# Patient Record
Sex: FEMALE | Race: WHITE | NOT HISPANIC OR LATINO | Employment: FULL TIME | URBAN - METROPOLITAN AREA
[De-identification: names, ages, dates, MRNs, and addresses within clinical notes are randomized per-mention and may not be internally consistent; named-entity substitution may affect disease eponyms.]

---

## 2021-05-10 ENCOUNTER — PATIENT MESSAGE (OUTPATIENT)
Dept: RESEARCH | Facility: HOSPITAL | Age: 46
End: 2021-05-10

## 2022-11-19 PROBLEM — E11.65 TYPE 2 DIABETES MELLITUS WITH HYPERGLYCEMIA, WITHOUT LONG-TERM CURRENT USE OF INSULIN: Status: RESOLVED | Noted: 2022-11-19 | Resolved: 2022-11-19

## 2022-11-19 PROBLEM — R80.9 MICROALBUMINURIA: Status: ACTIVE | Noted: 2022-11-19

## 2022-11-19 PROBLEM — E11.65 TYPE 2 DIABETES MELLITUS WITH HYPERGLYCEMIA, WITHOUT LONG-TERM CURRENT USE OF INSULIN: Status: ACTIVE | Noted: 2022-11-19

## 2022-11-19 PROBLEM — E55.9 VITAMIN D DEFICIENCY, UNSPECIFIED: Status: ACTIVE | Noted: 2022-11-19

## 2023-07-05 ENCOUNTER — TELEPHONE (OUTPATIENT)
Dept: ENDOCRINOLOGY | Facility: CLINIC | Age: 48
End: 2023-07-05
Payer: COMMERCIAL

## 2023-07-05 NOTE — TELEPHONE ENCOUNTER
----- Message from Mamie Peña sent at 7/5/2023 10:49 AM CDT -----  Contact: Patient  Type:  Appointment Request    Caller is requesting a sooner appointment.  Caller declined first available appointment listed below.  Caller will not accept being placed on the waitlist and is requesting a message be sent to doctor.    Name of Caller:  Patient  When is the first available appointment?  N/A    Would the patient rather a call back or a response via [facility]?   Call back  Best Call Back Number:  [phone]    Additional Information: There is a referral in the system to schedule an appointment with Dr Bryan - patient states she tried scheduling on her [facility], but his name didn't pop up - tried scheduling through Epic and running into same - please call to schedule/advise - thank you

## 2023-08-09 ENCOUNTER — OFFICE VISIT (OUTPATIENT)
Dept: ENDOCRINOLOGY | Facility: CLINIC | Age: 48
End: 2023-08-09
Payer: COMMERCIAL

## 2023-08-09 VITALS
SYSTOLIC BLOOD PRESSURE: 132 MMHG | DIASTOLIC BLOOD PRESSURE: 86 MMHG | WEIGHT: 199.94 LBS | HEIGHT: 63 IN | OXYGEN SATURATION: 97 % | HEART RATE: 102 BPM | BODY MASS INDEX: 35.43 KG/M2

## 2023-08-09 DIAGNOSIS — E78.00 HYPERCHOLESTEROLEMIA: ICD-10-CM

## 2023-08-09 DIAGNOSIS — E03.1 CONGENITAL HYPOTHYROIDISM WITHOUT GOITER: ICD-10-CM

## 2023-08-09 DIAGNOSIS — E11.65 TYPE 2 DIABETES MELLITUS WITH HYPERGLYCEMIA, WITHOUT LONG-TERM CURRENT USE OF INSULIN: Primary | ICD-10-CM

## 2023-08-09 DIAGNOSIS — E11.65 CONTROLLED TYPE 2 DIABETES MELLITUS WITH HYPERGLYCEMIA, UNSPECIFIED WHETHER LONG TERM INSULIN USE: ICD-10-CM

## 2023-08-09 PROCEDURE — 3075F SYST BP GE 130 - 139MM HG: CPT | Mod: CPTII,S$GLB,, | Performed by: NURSE PRACTITIONER

## 2023-08-09 PROCEDURE — 3075F PR MOST RECENT SYSTOLIC BLOOD PRESS GE 130-139MM HG: ICD-10-PCS | Mod: CPTII,S$GLB,, | Performed by: NURSE PRACTITIONER

## 2023-08-09 PROCEDURE — 1159F MED LIST DOCD IN RCRD: CPT | Mod: CPTII,S$GLB,, | Performed by: NURSE PRACTITIONER

## 2023-08-09 PROCEDURE — 99999 PR PBB SHADOW E&M-EST. PATIENT-LVL V: ICD-10-PCS | Mod: PBBFAC,,, | Performed by: NURSE PRACTITIONER

## 2023-08-09 PROCEDURE — 99214 OFFICE O/P EST MOD 30 MIN: CPT | Mod: S$GLB,,, | Performed by: NURSE PRACTITIONER

## 2023-08-09 PROCEDURE — 3008F PR BODY MASS INDEX (BMI) DOCUMENTED: ICD-10-PCS | Mod: CPTII,S$GLB,, | Performed by: NURSE PRACTITIONER

## 2023-08-09 PROCEDURE — 3008F BODY MASS INDEX DOCD: CPT | Mod: CPTII,S$GLB,, | Performed by: NURSE PRACTITIONER

## 2023-08-09 PROCEDURE — 3079F PR MOST RECENT DIASTOLIC BLOOD PRESSURE 80-89 MM HG: ICD-10-PCS | Mod: CPTII,S$GLB,, | Performed by: NURSE PRACTITIONER

## 2023-08-09 PROCEDURE — 3079F DIAST BP 80-89 MM HG: CPT | Mod: CPTII,S$GLB,, | Performed by: NURSE PRACTITIONER

## 2023-08-09 PROCEDURE — 99214 PR OFFICE/OUTPT VISIT, EST, LEVL IV, 30-39 MIN: ICD-10-PCS | Mod: S$GLB,,, | Performed by: NURSE PRACTITIONER

## 2023-08-09 PROCEDURE — 99999 PR PBB SHADOW E&M-EST. PATIENT-LVL V: CPT | Mod: PBBFAC,,, | Performed by: NURSE PRACTITIONER

## 2023-08-09 PROCEDURE — 1159F PR MEDICATION LIST DOCUMENTED IN MEDICAL RECORD: ICD-10-PCS | Mod: CPTII,S$GLB,, | Performed by: NURSE PRACTITIONER

## 2023-08-09 RX ORDER — LANCETS
EACH MISCELLANEOUS
Qty: 100 EACH | Refills: 12 | Status: SHIPPED | OUTPATIENT
Start: 2023-08-09

## 2023-08-09 RX ORDER — INSULIN DEGLUDEC 200 U/ML
16 INJECTION, SOLUTION SUBCUTANEOUS NIGHTLY
Qty: 1 PEN | Refills: 0 | COMMUNITY
Start: 2023-08-09 | End: 2023-09-06 | Stop reason: SDUPTHER

## 2023-08-09 RX ORDER — INSULIN PUMP SYRINGE, 3 ML
EACH MISCELLANEOUS
Qty: 1 EACH | Refills: 0 | Status: SHIPPED | OUTPATIENT
Start: 2023-08-09 | End: 2024-08-08

## 2023-08-09 RX ORDER — OLOPATADINE HYDROCHLORIDE 1 MG/ML
1 SOLUTION/ DROPS OPHTHALMIC
COMMUNITY
Start: 2023-07-12

## 2023-08-09 NOTE — PATIENT INSTRUCTIONS
Start with 16 units of Tresiba tomorrow night.       YOu may increase by 2 units every 5 days until your fasting glucoses are less ,,150

## 2023-08-09 NOTE — PROGRESS NOTES
"Subjective     Patient ID: Jessica Pope is a 48 y.o. female.    Chief Complaint: Diabetes    HPI  Pt is a 48 y.o. wf  with a diagnosis of Type 2  diabetes mellitus diagnosed approximately 2007 as gestational, and then converted to Type 2 DM --2013. , as well as chronic conditions pending review including hypothryoidism, hlp. .  Other pertinent medical and social information noted includes, but not limited to: none . Works as . [employer]     Interim Events:  A1c recently per ob gyn--June 2023--11%. Likley pending EN.  Pt is asymptomatic regarding hyperglycemia. On max glimepiride and tolerated metformin.     Avoiding juices, drinking water    Bkft   usually just toast  or  rare cinnamon roll from s--but this is a rarity   Lunch--fast food to left overs    Supper--Tries to cook at [employer],  likes to smoke meat.      Current DM meds:   glimeperide 4 mg bid,  metformin 750 xr bid --(cannot tolerate higher dose).        Failed DM meds:  none        Back up plan for insulin pump hiatus:   Statin: atorvastatin 40mg        Not tolerated statin : na   ACE/ARB:none        Not tolerated ACE/ARB: none   Known Diabetic complications: none       Review of Systems   Constitutional:  Negative for activity change and fatigue.        Blood pressure 132/86, pulse 102, height 5' 3" (1.6 m), weight 90.7 kg (199 lb 15.3 oz), SpO2 97 %.   HENT:  Negative for hearing loss and trouble swallowing.    Eyes:  Negative for photophobia and visual disturbance.        Last Eye Exam:  overdue 2 years.     Respiratory:  Negative for cough and shortness of breath.    Cardiovascular:  Negative for chest pain and palpitations.   Gastrointestinal:  Positive for reflux. Negative for constipation and diarrhea.   Genitourinary:  Negative for frequency and urgency.   Musculoskeletal:  Negative for arthralgias and myalgias.   Integumentary:  Negative for rash and wound.   Neurological:  Negative for weakness and " "numbness.   Psychiatric/Behavioral:  Negative for sleep disturbance. The patient is not nervous/anxious.           Objective     Physical Exam  Constitutional:       Appearance: Normal appearance. She is obese.   HENT:      Head: Normocephalic and atraumatic.      Nose: Nose normal.      Mouth/Throat:      Mouth: Mucous membranes are moist.      Pharynx: Oropharynx is clear.   Eyes:      Extraocular Movements: Extraocular movements intact.      Conjunctiva/sclera: Conjunctivae normal.      Pupils: Pupils are equal, round, and reactive to light.   Neck:      Vascular: No carotid bruit.   Cardiovascular:      Rate and Rhythm: Normal rate and regular rhythm.   Pulmonary:      Effort: Pulmonary effort is normal.      Breath sounds: Normal breath sounds.   Musculoskeletal:         General: Normal range of motion.      Cervical back: Normal range of motion and neck supple.      Right lower leg: No edema.      Left lower leg: No edema.      Comments: Feet: no open wounds or calluses. Good pedal care  Pedal pulses +2 bilaterally, sensation via monofilament intact bilaterally    Lymphadenopathy:      Cervical: No cervical adenopathy.   Skin:     General: Skin is warm and dry.   Neurological:      General: No focal deficit present.      Mental Status: She is alert and oriented to person, place, and time.   Psychiatric:         Mood and Affect: Mood normal.         Behavior: Behavior normal.         Thought Content: Thought content normal.         Judgment: Judgment normal.            /86 (BP Location: Right arm, Patient Position: Sitting, BP Method: Large (Manual))   Pulse 102   Ht 5' 3" (1.6 m)   Wt 90.7 kg (199 lb 15.3 oz)   SpO2 97%   BMI 35.42 kg/m²     Hemoglobin A1C   Date Value Ref Range Status   11/02/2022 8.3 (H) <5.7 % of total Hgb Final     Comment:     For someone without known diabetes, a hemoglobin A1c  value of 6.5% or greater indicates that they may have   diabetes and this should be confirmed with " a follow-up   test.     For someone with known diabetes, a value <7% indicates   that their diabetes is well controlled and a value   greater than or equal to 7% indicates suboptimal   control. A1c targets should be individualized based on   duration of diabetes, age, comorbid conditions, and   other considerations.     Currently, no consensus exists regarding use of  hemoglobin A1c for diagnosis of diabetes for children.         08/03/2021 9.5 (H) <5.7 % of total Hgb Final     Comment:     For someone without known diabetes, a hemoglobin A1c  value of 6.5% or greater indicates that they may have   diabetes and this should be confirmed with a follow-up   test.     For someone with known diabetes, a value <7% indicates   that their diabetes is well controlled and a value   greater than or equal to 7% indicates suboptimal   control. A1c targets should be individualized based on   duration of diabetes, age, comorbid conditions, and   other considerations.     Currently, no consensus exists regarding use of  hemoglobin A1c for diagnosis of diabetes for children.         12/18/2020 9.4 (H) <5.7 % of total Hgb Final     Comment:     For someone without known diabetes, a hemoglobin A1c  value of 6.5% or greater indicates that they may have   diabetes and this should be confirmed with a follow-up   test.     For someone with known diabetes, a value <7% indicates   that their diabetes is well controlled and a value   greater than or equal to 7% indicates suboptimal   control. A1c targets should be individualized based on   duration of diabetes, age, comorbid conditions, and   other considerations.     Currently, no consensus exists regarding use of  hemoglobin A1c for diagnosis of diabetes for children.             Chemistry        Component Value Date/Time     11/02/2022 0827     06/06/2015 0725    K 4.4 11/02/2022 0827    K 4.2 06/06/2015 0725     11/02/2022 0827     06/06/2015 0725    CO2 24  "11/02/2022 0827    BUN 10 11/02/2022 0827    CREATININE 0.67 11/02/2022 0827    CREATININE 0.70 06/06/2015 0725     (H) 11/02/2022 0827        Component Value Date/Time    CALCIUM 9.1 11/02/2022 0827    ALKPHOS 105 10/10/2018 0743    AST 15 11/02/2022 0827    ALT 21 11/02/2022 0827    BILITOT 0.8 11/02/2022 0827    ESTGFRAFRICA 110 12/18/2020 0739    EGFRNONAA 95 12/18/2020 0739          Lab Results   Component Value Date    CHOL 180 11/02/2022     Lab Results   Component Value Date    HDL 75 11/02/2022     Lab Results   Component Value Date    LDLCALC 81 11/02/2022     Lab Results   Component Value Date    TRIG 137 11/02/2022     Lab Results   Component Value Date    CHOLHDL 2.4 11/02/2022     Lab Results   Component Value Date    MICALBCREAT 42 (H) 11/02/2022     Lab Results   Component Value Date    TSH 3.16 11/02/2022     No results found for: "[id number]"    Assessment and Plan     1. Type 2 Diabetes Mellitus  blood-glucose meter kit    lancets Misc    blood sugar diagnostic Strp    insulin degludec (TRESIBA FLEXTOUCH U-200) 200 unit/mL (3 mL) insulin pen      2. Controlled type 2 diabetes mellitus with hyperglycemia, unspecified whether long term insulin use  Ambulatory referral/consult to Endocrinology    Ambulatory referral/consult to Diabetes Education      3. Congenital hypothyroidism without goiter        4. Hypercholesterolemia          On atorvastatin  Should be on ace.   Start 16 Tresiba and can titrate up 2 units every 5 days.  1 sample.       ORDERS 08/09/2023   Cons diabetes ED--review logs--see if I need to add prandial  insulin, patsy review ss, and diet.     F/u 6 weeks.            "

## 2023-08-23 ENCOUNTER — CLINICAL SUPPORT (OUTPATIENT)
Dept: DIABETES | Facility: CLINIC | Age: 48
End: 2023-08-23
Payer: COMMERCIAL

## 2023-08-23 ENCOUNTER — TELEPHONE (OUTPATIENT)
Dept: ENDOCRINOLOGY | Facility: CLINIC | Age: 48
End: 2023-08-23
Payer: COMMERCIAL

## 2023-08-23 VITALS — WEIGHT: 201.25 LBS | BODY MASS INDEX: 33.53 KG/M2 | HEIGHT: 65 IN

## 2023-08-23 DIAGNOSIS — E11.65 CONTROLLED TYPE 2 DIABETES MELLITUS WITH HYPERGLYCEMIA, UNSPECIFIED WHETHER LONG TERM INSULIN USE: ICD-10-CM

## 2023-08-23 DIAGNOSIS — E11.65 TYPE 2 DIABETES MELLITUS WITH HYPERGLYCEMIA, WITHOUT LONG-TERM CURRENT USE OF INSULIN: Primary | ICD-10-CM

## 2023-08-23 PROCEDURE — G0108 DIAB MANAGE TRN  PER INDIV: HCPCS | Mod: S$GLB,,, | Performed by: DIETITIAN, REGISTERED

## 2023-08-23 PROCEDURE — 99999 PR PBB SHADOW E&M-EST. PATIENT-LVL III: CPT | Mod: PBBFAC,,, | Performed by: DIETITIAN, REGISTERED

## 2023-08-23 PROCEDURE — G0108 PR DIAB MANAGE TRN  PER INDIV: ICD-10-PCS | Mod: S$GLB,,, | Performed by: DIETITIAN, REGISTERED

## 2023-08-23 PROCEDURE — 99999 PR PBB SHADOW E&M-EST. PATIENT-LVL III: ICD-10-PCS | Mod: PBBFAC,,, | Performed by: DIETITIAN, REGISTERED

## 2023-08-23 RX ORDER — PEN NEEDLE, DIABETIC 30 GX3/16"
NEEDLE, DISPOSABLE MISCELLANEOUS
Qty: 100 EACH | Status: SHIPPED | OUTPATIENT
Start: 2023-08-23

## 2023-08-23 RX ORDER — INSULIN ASPART 100 [IU]/ML
INJECTION, SOLUTION INTRAVENOUS; SUBCUTANEOUS
Qty: 15 ML | Status: SHIPPED | OUTPATIENT
Start: 2023-08-23 | End: 2023-10-04 | Stop reason: CLARIF

## 2023-08-23 NOTE — PROGRESS NOTES
Diabetes Care Specialist Progress Note  Author: Karely King RD, CDE  Date: 8/23/2023    Program Intake  Reason for Diabetes Program Visit:: Initial Diabetes Assessment-Patient referred for log review and basic dm ed review.  She was last seen for dm ed in 2021.  She was started on insulin about 2 weeks ago and blood sugars are slowly improving.  Current diabetes risk level:: high    Lab Results   Component Value Date    LABA1C 7.3 (H) 02/19/2018    HGBA1C 8.3 (H) 11/02/2022     Clinical    Problem Review  Reviewed Problem List with Patient: yes  Active comorbidities affecting diabetes self-care.: no  Reviewed health maintenance: yes    Clinical Assessment  Current Diabetes Treatment: Oral Medication, Insulin-Metformin, Glimepiride, Tresiba 20 daily, Osiris added sliding scale insulin today  Have you ever experienced hypoglycemia?: yes (none recent)  Are you able to tell when your blood sugar is low?: Yes    Medication Information  How do you obtain your medications?: Patient drives  How many days a week do you miss your medications?: Never  Do you sometimes have difficulty refilling your medications?: No  Medication adherence impacting ability to self-manage diabetes?: No    Labs  Do you have regular lab work to monitor your medications?: Yes  Type of Regular Lab Work: A1c, Cholesterol, CBC (reports had recent A1c down with OBGYN and it was in 11% range)  Where do you get your labs drawn?: [facility]  Lab Compliance Barriers: No    Nutritional Status  Diet: Regular-Patient reports decrease in appetite.  She eats 2-3 meals daily.  Drinks mainly water and Sprite Zero.  Does not eat much fruit or drink milk often.  Eating moderate amount of starchy foods  Meal Plan 24 Hour Recall:   Meal Plan 24 Hour Recall - Breakfast:  Toast or grits or microwave breakfast sandwich  Change in appetite?: No  Dentation:: Intact  Recent Changes in Weight: No Recent Weight Change  Current nutritional status an area of need that is  impacting patient's ability to self-manage diabetes?: No    Additional Social History    Support  Does anyone support you with your diabetes care?: yes  Who supports you?: spouse  Who takes you to your medical appointments?: self  Does the current support meet the patient's needs?: Yes  Is Support an area impacting ability to self-manage diabetes?: No    Access to Mass Media & Technology  Does the patient have access to any of the following devices or technologies?: Smart phone  Media or technology needs impacting ability to self-manage diabetes?: No    Cognitive/Behavioral Health  Alert and Oriented: Yes  Difficulty Thinking: No  Requires Prompting: No  Requires assistance for routine expression?: No  Cognitive or behavioral barriers impacting ability to self-manage diabetes?: No    Culture/Adventism  Culture or Christianity beliefs that may impact ability to access healthcare: No    Communication  Language preference: English  Hearing Problems: No  Vision Problems: No  Communication needs impacting ability to self-manage diabetes?: No    Health Literacy  Preferred Learning Method: Face to Face  How often do you need to have someone help you read instructions, pamphlets, or written material from your doctor or pharmacy?: Never  Health literacy needs impacting ability to self-manage diabetes?: No    Diabetes Self-Management Skills Assessment    Diabetes Disease Process/Treatment Options  Patient/caregiver able to state what happens when someone has diabetes.: yes  Patient/caregiver knows what type of diabetes they have.: yes  Diabetes Type : Type II  Patient/caregiver able to identify at least three signs and symptoms of diabetes.: yes (denies most symptoms unless blood sugar is over 300)  Patient able to identify at least three risk factors for diabetes.: yes  Diabetes Disease Process/Treatment Options: Skills Assessment Completed: Yes  Assessment indicates:: Adequate understanding  Area of need?:  Yes    Nutrition/Healthy Eating  Challenges to healthy eating:: portion control, snacking between meals and at night (skipping meals)  Method of carbohydrate measurement:: eyeballing/guessing (has used My Fitness Pal in the past)  Patient can identify foods that impact blood sugar.: yes  Patient-identified foods:: starches (bread, pasta, rice, cereal), sweets, fruit/fruit juice, milk  Nutrition/Healthy Eating Skills Assessment Completed:: Yes  Assessment indicates:: Instruction Needed  Area of need?: Yes    Physical Activity/Exercise  Patient's daily activity level:: lightly active  Patient can identify forms of physical activity.: yes  Physical Activity/Exercise Skills Assessment Completed: : Yes  Assessment indicates:: Adequate understanding  Area of need?: Yes    Medications  Patient is able to describe current diabetes management routine.: yes  Diabetes management routine:: insulin, oral medications-has titrated Tresiba up to 20 units over past 2 weeks  Patient is able to identify current diabetes medications, dosages, and appropriate timing of medications.: yes  Patient understands the purpose of the medications taken for diabetes.: yes  Patient reports problems or concerns with current medication regimen.: no  Medication Skills Assessment Completed:: Yes  Assessment indicates:: Instruction Needed  Area of need?: Yes    Home Blood Glucose Monitoring  Patient states that blood sugar is checked at home daily.: yes  Monitoring Method:: home glucometer  How often do you check your blood sugar?: Twice a day  When do you check your blood sugar?:  alternating  When you check what is your typical blood sugar range? :  variable- see attached logs  Blood glucose logs:: yes  Blood glucose logs reviewed today?: yes -Osiris reviewed logs and will start her with sliding scale insulin in addition to Tresiba  Home Blood Glucose Monitoring Skills Assessment Completed: : Yes  Assessment indicates:: Adequate understanding  Area of  need?: No        Acute Complications  Patient is able to identify types of acute complications: Yes (reviewed symptoms, prevention and treatment of hypoglycemia)  Acute Complications Skills Assessment Completed: : Yes  Assessment indicates:: Adequate understanding  Area of need?: No    Chronic Complications  Patient can identify major chronic complications of diabetes.: yes  Stated chronic complications:: heart disease/heart attack, kidney disease  Patient is aware that having diabetes increases risk of heart disease?: Yes  Patient is taking statin?: Yes  Chronic Complications Skills Assessment Completed: : Yes  Assessment indicates:: Adequate understanding  Area of need?: No    Psychosocial/Coping  Patient can identify ways of coping with chronic disease.: yes  Patient-stated ways of coping with chronic disease:: support from loved ones  Psychosocial/Coping Skills Assessment Completed: : Yes  Assessment indicates:: Adequate understanding  Area of need?: No    Assessment Summary and Plan    Based on today's diabetes care assessment, the following areas of need were identified:          8/23/2023    12:01 AM   Social   Support No   Access to Mass Media/Tech No   Cognitive/Behavioral Health No   Culture/Baptism No   Communication No   Health Literacy No          8/23/2023    12:01 AM   Clinical   Medication Adherence No   Lab Compliance No   Nutritional Status No          8/23/2023    12:01 AM   Diabetes Self-Management Skills   Diabetes Disease Process/Treatment Options Yes- reviewed goal blood sugars and A1c goal   Nutrition/Healthy Eating Yes- reviewed carb sources and appropriate amounts per meal and snack/ importance of eating 3 meals daily reviewed.    Physical Activity/Exercise Yes- encouraged regular activity to help enhance improved blood sugar control   Medication Yes- care planning created today   Home Blood Glucose Monitoring No   Acute Complications No   Chronic Complications No   Psychosocial/Coping No       Today's interventions were provided through individual discussion, instruction, and written materials were provided.      Patient verbalized understanding of instruction and written materials.  Pt was able to return back demonstration of instructions today. Patient understood key points, needs reinforcement and further instruction.     Diabetes Self-Management Care Plan:    Today's Diabetes Self-Management Care Plan was developed with Jessica's input. Jessica has agreed to work toward the following goal(s) to improve his/her overall diabetes control.      Care Plan: Diabetes Management   Updates made since 7/24/2023 12:00 AM        Problem: Medications         Long-Range Goal: Patient Agrees to take Diabetes Medication and insulin as prescribed.  Will add Novolog sliding scale to her regimen and use as needed.    Start Date: 8/23/2023   Priority: Medium   Barriers: No Barriers Identified   Note:    Reviewed action of Novolog/Humalog insulin and timing of injections.  She can take this up to 4 times daily before each meal and before bedtime.  She will use following sliding scale as per Osiris:    150-200 - +2  201-250- +4  250-300- +6  301-350- +8  >350 - +10       Task: Reviewed with patient all current diabetes medications and provided basic review of the purpose, dosage, frequency, side effects, and storage of both oral and injectable diabetes medications. Completed 8/23/2023        Task: Reviewed use of sliding scale Completed 8/23/2023        Task: Discussed guidelines for preventing, detecting and treating hypoglycemia and hyperglycemia and reviewed the importance of meal and medication timing with diabetes mediations for prevention of hypoglycemia and maximum drug benefit. Completed 8/23/2023        Follow Up Plan     Follow up in about 3 months (around 11/23/2023).  Patient will f/u with Osiris on 9/21 and bring another glucose log to review at that point.   She can also send one in prior if she develops  any new patterns.  Encouraged her to call in interim with questions/concerns.      Today's care plan and follow up schedule was discussed with patient.  Jessica verbalized understanding of the care plan, goals, and agrees to follow up plan.        The patient was encouraged to communicate with his/her health care provider/physician and care team regarding his/her condition(s) and treatment.  I provided the patient with my contact information today and encouraged to contact me via phone or [facility] Portal as needed.     Length of Visit   Total Time: 45 Minutes

## 2023-08-23 NOTE — TELEPHONE ENCOUNTER
----- Message from Wesley Hernandez sent at 8/23/2023  3:28 PM CDT -----  Type:  Pharmacy Calling to Clarify an RX    Name of Caller:  Dayron  Pharmacy Name:  [facility]   Prescription Name:  insulin aspart U-100 (NOVOLOG FLEXPEN U-100 INSULIN) 100 unit/mL (3 mL) InPn pen  What do they need to clarify?:  Co-pay is $75, asking if Humalog can be substituted as the co-pay is only $35.   Best Call Back Number:  Phone: [phone] Fax: [phone]  Additional Information:  Please call back to advise Thanks!

## 2023-08-23 NOTE — TELEPHONE ENCOUNTER
S/w pharmacy. They said that novolog co-pay is $75 and humalog is $35. They want to know if is ok for you to change it to Humalog and if you can send a new Rx for Humalog    Please advise

## 2023-08-24 DIAGNOSIS — E11.65 TYPE 2 DIABETES MELLITUS WITH HYPERGLYCEMIA, WITHOUT LONG-TERM CURRENT USE OF INSULIN: Primary | ICD-10-CM

## 2023-08-24 RX ORDER — INSULIN LISPRO 100 [IU]/ML
INJECTION, SOLUTION INTRAVENOUS; SUBCUTANEOUS
Qty: 15 ML | Status: SHIPPED | OUTPATIENT
Start: 2023-08-24

## 2023-09-05 ENCOUNTER — PATIENT MESSAGE (OUTPATIENT)
Dept: ENDOCRINOLOGY | Facility: CLINIC | Age: 48
End: 2023-09-05
Payer: COMMERCIAL

## 2023-09-06 DIAGNOSIS — E11.65 TYPE 2 DIABETES MELLITUS WITH HYPERGLYCEMIA, WITHOUT LONG-TERM CURRENT USE OF INSULIN: ICD-10-CM

## 2023-09-06 RX ORDER — INSULIN DEGLUDEC 200 U/ML
20 INJECTION, SOLUTION SUBCUTANEOUS NIGHTLY
Qty: 5 PEN | Refills: 6 | Status: SHIPPED | OUTPATIENT
Start: 2023-09-06 | End: 2023-09-15 | Stop reason: CLARIF

## 2023-09-08 ENCOUNTER — TELEPHONE (OUTPATIENT)
Dept: ENDOCRINOLOGY | Facility: CLINIC | Age: 48
End: 2023-09-08
Payer: COMMERCIAL

## 2023-09-08 NOTE — TELEPHONE ENCOUNTER
Spoke to Dayron at pharm and adv we have not recvd any msgs but authorized change from tresiba to toujeo.

## 2023-09-08 NOTE — TELEPHONE ENCOUNTER
----- Message from Kareyl Luna sent at 9/8/2023 12:43 PM CDT -----  Contact[facility]  Type:  Pharmacy Calling to Clarify an RX  URGENT!    Name of Caller:abhijit  [facility]  Prescription Name:insulin degludec (TRESIBA FLEXTOUCH U-200) 200 unit/mL (3 mL) insulin pen  What do they need to clarify?:WANTS TO CHANGE IT TO Toujeo    Best Call Back Number:    Additional Information: Pt is now completely OUT of insulin    Wants to change the insulin degludec (TRESIBA FLEXTOUCH U-200) 200 unit/mL (3 mL) insulin pen  to Toujeo. The copay is just too expensive for the degludec.

## 2023-09-15 DIAGNOSIS — E11.65 TYPE 2 DIABETES MELLITUS WITH HYPERGLYCEMIA, WITHOUT LONG-TERM CURRENT USE OF INSULIN: Primary | ICD-10-CM

## 2023-09-15 RX ORDER — INSULIN GLARGINE 300 U/ML
INJECTION, SOLUTION SUBCUTANEOUS
Qty: 2 PEN | Refills: 12 | Status: SHIPPED | OUTPATIENT
Start: 2023-09-15 | End: 2023-10-18 | Stop reason: SDUPTHER

## 2023-10-04 ENCOUNTER — OFFICE VISIT (OUTPATIENT)
Dept: ENDOCRINOLOGY | Facility: CLINIC | Age: 48
End: 2023-10-04
Payer: COMMERCIAL

## 2023-10-04 VITALS
DIASTOLIC BLOOD PRESSURE: 82 MMHG | SYSTOLIC BLOOD PRESSURE: 140 MMHG | BODY MASS INDEX: 34.2 KG/M2 | WEIGHT: 205.25 LBS | OXYGEN SATURATION: 99 % | HEIGHT: 65 IN | HEART RATE: 101 BPM

## 2023-10-04 DIAGNOSIS — Z79.4 TYPE 2 DIABETES MELLITUS WITHOUT COMPLICATION, WITH LONG-TERM CURRENT USE OF INSULIN: Primary | ICD-10-CM

## 2023-10-04 DIAGNOSIS — E11.65 TYPE 2 DIABETES MELLITUS WITH HYPERGLYCEMIA, WITHOUT LONG-TERM CURRENT USE OF INSULIN: ICD-10-CM

## 2023-10-04 DIAGNOSIS — E11.9 TYPE 2 DIABETES MELLITUS WITHOUT COMPLICATION, WITH LONG-TERM CURRENT USE OF INSULIN: Primary | ICD-10-CM

## 2023-10-04 DIAGNOSIS — N93.8 DUB (DYSFUNCTIONAL UTERINE BLEEDING): ICD-10-CM

## 2023-10-04 PROCEDURE — 1159F PR MEDICATION LIST DOCUMENTED IN MEDICAL RECORD: ICD-10-PCS | Mod: CPTII,S$GLB,, | Performed by: NURSE PRACTITIONER

## 2023-10-04 PROCEDURE — 1159F MED LIST DOCD IN RCRD: CPT | Mod: CPTII,S$GLB,, | Performed by: NURSE PRACTITIONER

## 2023-10-04 PROCEDURE — 99999 PR PBB SHADOW E&M-EST. PATIENT-LVL IV: ICD-10-PCS | Mod: PBBFAC,,, | Performed by: NURSE PRACTITIONER

## 2023-10-04 PROCEDURE — 3008F BODY MASS INDEX DOCD: CPT | Mod: CPTII,S$GLB,, | Performed by: NURSE PRACTITIONER

## 2023-10-04 PROCEDURE — 3077F SYST BP >= 140 MM HG: CPT | Mod: CPTII,S$GLB,, | Performed by: NURSE PRACTITIONER

## 2023-10-04 PROCEDURE — 99213 OFFICE O/P EST LOW 20 MIN: CPT | Mod: S$GLB,,, | Performed by: NURSE PRACTITIONER

## 2023-10-04 PROCEDURE — 3008F PR BODY MASS INDEX (BMI) DOCUMENTED: ICD-10-PCS | Mod: CPTII,S$GLB,, | Performed by: NURSE PRACTITIONER

## 2023-10-04 PROCEDURE — 3079F DIAST BP 80-89 MM HG: CPT | Mod: CPTII,S$GLB,, | Performed by: NURSE PRACTITIONER

## 2023-10-04 PROCEDURE — 99999 PR PBB SHADOW E&M-EST. PATIENT-LVL IV: CPT | Mod: PBBFAC,,, | Performed by: NURSE PRACTITIONER

## 2023-10-04 PROCEDURE — 3079F PR MOST RECENT DIASTOLIC BLOOD PRESSURE 80-89 MM HG: ICD-10-PCS | Mod: CPTII,S$GLB,, | Performed by: NURSE PRACTITIONER

## 2023-10-04 PROCEDURE — 99213 PR OFFICE/OUTPT VISIT, EST, LEVL III, 20-29 MIN: ICD-10-PCS | Mod: S$GLB,,, | Performed by: NURSE PRACTITIONER

## 2023-10-04 PROCEDURE — 3077F PR MOST RECENT SYSTOLIC BLOOD PRESSURE >= 140 MM HG: ICD-10-PCS | Mod: CPTII,S$GLB,, | Performed by: NURSE PRACTITIONER

## 2023-10-04 RX ORDER — TIRZEPATIDE 2.5 MG/.5ML
2.5 INJECTION, SOLUTION SUBCUTANEOUS
Qty: 4 PEN | Refills: 0 | Status: SHIPPED | OUTPATIENT
Start: 2023-10-04 | End: 2023-10-29 | Stop reason: SDUPTHER

## 2023-10-04 NOTE — PROGRESS NOTES
Subjective     Patient ID: Jessica Pope is a 48 y.o. female.    Chief Complaint: Diabetes     HPI  Pt is a 48 y.o. wf  with a diagnosis of Type 2  diabetes mellitus diagnosed approximately 2007 as gestational, and then converted to Type 2 DM --2013. , as well as chronic conditions pending review including hypothryoidism, hlp. .  Other pertinent medical and social information noted includes, but not limited to: none . Works as . [employer], [employer]     Interim Events: Oct 4, 2023:  Pt initially seen and was  pending EN r/t DUB but reported a1c 11%.  Pt started on Tresiba--which she has titrated to 32 units and novolog ss.  Glucoses improved, but still abovoe goal . No hypoglycemia.   Drug costs are a concern as she has marked deductible.        Current DM meds:   glimeperide 4 mg bid,  metformin 750 xr bid --(cannot tolerate higher dose). Toujeo 32 units, humalog ss        Failed DM meds:  none        Back up plan for insulin pump hiatus:   Statin: atorvastatin 40mg        Not tolerated statin : na   ACE/ARB:none        Not tolerated ACE/ARB: none   Known Diabetic complications: none     Tech Passord:  Barbie:  [email or web address]  PW:  [email or web address]            Aug 9, 2023:  A1c recently per ob gyn--June 2023--11%. Neva pending EN.  Pt is asymptomatic regarding hyperglycemia. On max glimepiride and tolerated metformin.     Avoiding juices, drinking water    Bkft   usually just toast  or  rare cinnamon roll from [employer]--but this is a rarity   Lunch--fast food to left overs    Supper--Tries to cook at [employer],  likes to smoke meat.        Review of Systems   Constitutional:  Negative for activity change and fatigue.   HENT:  Negative for hearing loss and trouble swallowing.    Eyes:  Negative for photophobia and visual disturbance.        Last Eye Exam:  overdue 2 years.     Respiratory:  Negative for cough and shortness of breath.    Cardiovascular:  Negative for chest pain and palpitations.  "  Gastrointestinal:  Positive for reflux. Negative for constipation and diarrhea.   Genitourinary:  Negative for frequency and urgency.   Musculoskeletal:  Negative for arthralgias and myalgias.   Integumentary:  Negative for rash and wound.   Neurological:  Negative for weakness and numbness.   Psychiatric/Behavioral:  Negative for sleep disturbance. The patient is not nervous/anxious.           Objective     Physical Exam  Constitutional:       Appearance: Normal appearance. She is obese.   HENT:      Head: Normocephalic and atraumatic.      Nose: Nose normal.   Neurological:      General: No focal deficit present.      Mental Status: She is alert and oriented to person, place, and time.   Psychiatric:         Mood and Affect: Mood normal.         Behavior: Behavior normal.         Thought Content: Thought content normal.         Judgment: Judgment normal.            BP (!) 140/82 (BP Method: X-Large (Manual))   Pulse 101   Ht 5' 5" (1.651 m)   Wt 93.1 kg (205 lb 4 oz)   SpO2 99%   BMI 34.16 kg/m²     Hemoglobin A1C   Date Value Ref Range Status   11/02/2022 8.3 (H) <5.7 % of total Hgb Final     Comment:     For someone without known diabetes, a hemoglobin A1c  value of 6.5% or greater indicates that they may have   diabetes and this should be confirmed with a follow-up   test.     For someone with known diabetes, a value <7% indicates   that their diabetes is well controlled and a value   greater than or equal to 7% indicates suboptimal   control. A1c targets should be individualized based on   duration of diabetes, age, comorbid conditions, and   other considerations.     Currently, no consensus exists regarding use of  hemoglobin A1c for diagnosis of diabetes for children.         08/03/2021 9.5 (H) <5.7 % of total Hgb Final     Comment:     For someone without known diabetes, a hemoglobin A1c  value of 6.5% or greater indicates that they may have   diabetes and this should be confirmed with a follow-up "   test.     For someone with known diabetes, a value <7% indicates   that their diabetes is well controlled and a value   greater than or equal to 7% indicates suboptimal   control. A1c targets should be individualized based on   duration of diabetes, age, comorbid conditions, and   other considerations.     Currently, no consensus exists regarding use of  hemoglobin A1c for diagnosis of diabetes for children.         12/18/2020 9.4 (H) <5.7 % of total Hgb Final     Comment:     For someone without known diabetes, a hemoglobin A1c  value of 6.5% or greater indicates that they may have   diabetes and this should be confirmed with a follow-up   test.     For someone with known diabetes, a value <7% indicates   that their diabetes is well controlled and a value   greater than or equal to 7% indicates suboptimal   control. A1c targets should be individualized based on   duration of diabetes, age, comorbid conditions, and   other considerations.     Currently, no consensus exists regarding use of  hemoglobin A1c for diagnosis of diabetes for children.             Chemistry        Component Value Date/Time     11/02/2022 0827     06/06/2015 0725    K 4.4 11/02/2022 0827    K 4.2 06/06/2015 0725     11/02/2022 0827     06/06/2015 0725    CO2 24 11/02/2022 0827    BUN 10 11/02/2022 0827    CREATININE 0.67 11/02/2022 0827    CREATININE 0.70 06/06/2015 0725     (H) 11/02/2022 0827        Component Value Date/Time    CALCIUM 9.1 11/02/2022 0827    ALKPHOS 105 10/10/2018 0743    AST 15 11/02/2022 0827    ALT 21 11/02/2022 0827    BILITOT 0.8 11/02/2022 0827    ESTGFRAFRICA 110 12/18/2020 0739    EGFRNONAA 95 12/18/2020 0739          Lab Results   Component Value Date    CHOL 180 11/02/2022     Lab Results   Component Value Date    HDL 75 11/02/2022     Lab Results   Component Value Date    LDLCALC 81 11/02/2022     Lab Results   Component Value Date    TRIG 137 11/02/2022     Lab Results  "  Component Value Date    CHOLHDL 2.4 11/02/2022     Lab Results   Component Value Date    MICALBCREAT 42 (H) 11/02/2022     Lab Results   Component Value Date    TSH 3.16 11/02/2022     No results found for: "[id number]"    Assessment and Plan     1. Type 2 diabetes mellitus without complication, with long-term current use of insulin  tirzepatide (MOUNJARO) 2.5 mg/0.5 mL PnIj      2. Type 2 Diabetes Mellitus        3. DUB (dysfunctional uterine bleeding)            PLAN   On atorvastatin  Should be on ace.     Increase Toujeo from 32 to 36 with parameters for futher titration   Start mounjaro 2.5 mg--denies any personal or family hx of thyroid or pancreatic cancer or pancreatitis.  Cont glimepiride --for now.   Cont humalog ss.   Freestyle danny 3 sampled and started and sharing.        ORDERS 10/04/2023      2 week virutal--danny download.       "

## 2023-10-04 NOTE — PATIENT INSTRUCTIONS
Increase Toujeo from 32 to 36 units.       If after 5 days,   fasting glucoses are still > 150,  increase dose to 40 units.       Start mounjaro 2.5 mg.      Continue glimipiride    Cont metformin.

## 2023-10-11 ENCOUNTER — PATIENT MESSAGE (OUTPATIENT)
Dept: ENDOCRINOLOGY | Facility: CLINIC | Age: 48
End: 2023-10-11
Payer: COMMERCIAL

## 2023-10-18 ENCOUNTER — OFFICE VISIT (OUTPATIENT)
Dept: ENDOCRINOLOGY | Facility: CLINIC | Age: 48
End: 2023-10-18
Payer: COMMERCIAL

## 2023-10-18 DIAGNOSIS — E11.65 TYPE 2 DIABETES MELLITUS WITH HYPERGLYCEMIA, WITHOUT LONG-TERM CURRENT USE OF INSULIN: Primary | ICD-10-CM

## 2023-10-18 DIAGNOSIS — E11.65 TYPE 2 DIABETES MELLITUS WITH HYPERGLYCEMIA, WITHOUT LONG-TERM CURRENT USE OF INSULIN: ICD-10-CM

## 2023-10-18 PROCEDURE — 99213 PR OFFICE/OUTPT VISIT, EST, LEVL III, 20-29 MIN: ICD-10-PCS | Mod: 95,,, | Performed by: NURSE PRACTITIONER

## 2023-10-18 PROCEDURE — 99213 OFFICE O/P EST LOW 20 MIN: CPT | Mod: 95,,, | Performed by: NURSE PRACTITIONER

## 2023-10-18 RX ORDER — BLOOD-GLUCOSE SENSOR
1 EACH MISCELLANEOUS
Qty: 2 EACH | Refills: 12 | Status: SHIPPED | OUTPATIENT
Start: 2023-10-18 | End: 2024-01-26 | Stop reason: SDUPTHER

## 2023-10-18 RX ORDER — INSULIN GLARGINE 300 U/ML
30 INJECTION, SOLUTION SUBCUTANEOUS NIGHTLY
Qty: 2 PEN | Refills: 12 | Status: SHIPPED | OUTPATIENT
Start: 2023-10-18 | End: 2024-01-26 | Stop reason: SDUPTHER

## 2023-10-29 DIAGNOSIS — E11.9 TYPE 2 DIABETES MELLITUS WITHOUT COMPLICATION, WITH LONG-TERM CURRENT USE OF INSULIN: ICD-10-CM

## 2023-10-29 DIAGNOSIS — Z79.4 TYPE 2 DIABETES MELLITUS WITHOUT COMPLICATION, WITH LONG-TERM CURRENT USE OF INSULIN: ICD-10-CM

## 2023-10-30 RX ORDER — TIRZEPATIDE 2.5 MG/.5ML
2.5 INJECTION, SOLUTION SUBCUTANEOUS
Qty: 4 PEN | Refills: 0 | Status: SHIPPED | OUTPATIENT
Start: 2023-10-30 | End: 2024-01-02 | Stop reason: DRUGHIGH

## 2024-01-26 DIAGNOSIS — E11.65 TYPE 2 DIABETES MELLITUS WITH HYPERGLYCEMIA, WITHOUT LONG-TERM CURRENT USE OF INSULIN: ICD-10-CM

## 2024-01-31 RX ORDER — INSULIN GLARGINE 300 U/ML
30 INJECTION, SOLUTION SUBCUTANEOUS NIGHTLY
Qty: 3 PEN | Refills: 12 | Status: SHIPPED | OUTPATIENT
Start: 2024-01-31

## 2024-01-31 RX ORDER — BLOOD-GLUCOSE SENSOR
1 EACH MISCELLANEOUS
Qty: 2 EACH | Refills: 12 | Status: SHIPPED | OUTPATIENT
Start: 2024-01-31 | End: 2025-01-30

## 2024-04-10 ENCOUNTER — OFFICE VISIT (OUTPATIENT)
Dept: ENDOCRINOLOGY | Facility: CLINIC | Age: 49
End: 2024-04-10
Payer: COMMERCIAL

## 2024-04-10 VITALS
WEIGHT: 181.13 LBS | OXYGEN SATURATION: 100 % | DIASTOLIC BLOOD PRESSURE: 80 MMHG | HEIGHT: 65 IN | BODY MASS INDEX: 30.18 KG/M2 | HEART RATE: 92 BPM | SYSTOLIC BLOOD PRESSURE: 120 MMHG

## 2024-04-10 DIAGNOSIS — E11.9 TYPE 2 DIABETES MELLITUS WITHOUT COMPLICATION, WITH LONG-TERM CURRENT USE OF INSULIN: Primary | ICD-10-CM

## 2024-04-10 DIAGNOSIS — Z79.4 TYPE 2 DIABETES MELLITUS WITHOUT COMPLICATION, WITH LONG-TERM CURRENT USE OF INSULIN: Primary | ICD-10-CM

## 2024-04-10 DIAGNOSIS — E55.9 VITAMIN D DEFICIENCY, UNSPECIFIED: ICD-10-CM

## 2024-04-10 DIAGNOSIS — E03.1 CONGENITAL HYPOTHYROIDISM WITHOUT GOITER: ICD-10-CM

## 2024-04-10 DIAGNOSIS — E11.65 TYPE 2 DIABETES MELLITUS WITH HYPERGLYCEMIA, WITHOUT LONG-TERM CURRENT USE OF INSULIN: ICD-10-CM

## 2024-04-10 PROCEDURE — 99214 OFFICE O/P EST MOD 30 MIN: CPT | Mod: 25,S$GLB,, | Performed by: NURSE PRACTITIONER

## 2024-04-10 PROCEDURE — 3008F BODY MASS INDEX DOCD: CPT | Mod: CPTII,S$GLB,, | Performed by: NURSE PRACTITIONER

## 2024-04-10 PROCEDURE — 99999 PR PBB SHADOW E&M-EST. PATIENT-LVL III: CPT | Mod: PBBFAC,,, | Performed by: NURSE PRACTITIONER

## 2024-04-10 PROCEDURE — 95251 CONT GLUC MNTR ANALYSIS I&R: CPT | Mod: S$GLB,,, | Performed by: NURSE PRACTITIONER

## 2024-04-10 PROCEDURE — 3074F SYST BP LT 130 MM HG: CPT | Mod: CPTII,S$GLB,, | Performed by: NURSE PRACTITIONER

## 2024-04-10 PROCEDURE — 3079F DIAST BP 80-89 MM HG: CPT | Mod: CPTII,S$GLB,, | Performed by: NURSE PRACTITIONER

## 2024-04-10 PROCEDURE — 1159F MED LIST DOCD IN RCRD: CPT | Mod: CPTII,S$GLB,, | Performed by: NURSE PRACTITIONER

## 2024-04-10 NOTE — PROGRESS NOTES
Subjective     Patient ID: Jessica Pope is a 48 y.o. female.    Chief Complaint: Diabetes     HPI  Pt is a 48 y.o. wf  with a diagnosis of Type 2  diabetes mellitus diagnosed approximately 2007 as gestational, and then converted to Type 2 DM --2013. , as well as chronic conditions pending review including hypothryoidism, hlp. .  Other pertinent medical and social information noted includes, but not limited to: none . Works as . [employer], good historian     Interim Events:April 10, 2024:  No acute events or focal complaints.  Had recent labs but no A1C.  Sensor indicates average of 6.1% no c/o hypoglycemia     October 18, 2023:  Pt for 2 week sensor review after adding mounjaro, tresiba --with interim downware adjustment, and d/c of glimiperide in interim r/t c/o nocturnal hypoglycemia.  Pt doing well.  Hypoglycemia eliminated.      Current DM meds:   Toujeo 30 units, mounjaro 5 mg. Humalog ss,   metformin 750 xr bid --(cannot tolerate higher dose).        Failed DM meds:  glimeride 4 mg bid (d/c r/t hypoglycemia).        Back up plan for insulin pump hiatus:   Statin: atorvastatin 40mg        Not tolerated statin : na   ACE/ARB:none        Not tolerated ACE/ARB: none   Known Diabetic complications: none     Tech Passord:  Barbie:  [email or web address]  PW:  [email or web address]     Oct 4, 2023:  Pt initially seen and was  pending EN r/t DUB but reported a1c 11%.  Pt started on Tresiba--which she has titrated to 32 units and novolog ss.  Glucoses improved, but still abovoe goal . No hypoglycemia.   Drug costs are a concern as she has marked deductible.      CGMS Interpretation:       Sensor/Pump Interpretation or Prominent Theme: No recent hypoglycemia. Glucoses hovering low 100 range with some  expected prandial glucose elevations.        Aug 9, 2023:  A1c recently per ob gyn--June 2023--11%. Neva pending EN.  Pt is asymptomatic regarding hyperglycemia. On max glimepiride and tolerated metformin.     Avoiding  juices, drinking water    Bkft   usually just toast  or  rare cinnamon roll from [employer]--but this is a rarity   Lunch--fast food to left overs    Supper--Tries to cook at [employer],  likes to smoke meat.        Review of Systems   Constitutional:  Negative for activity change and fatigue.   HENT:  Negative for hearing loss and trouble swallowing.    Eyes:  Negative for photophobia and visual disturbance.        Last Eye Exam:  overdue 2 years.     Respiratory:  Negative for cough and shortness of breath.    Cardiovascular:  Negative for chest pain and palpitations.   Gastrointestinal:  Positive for reflux. Negative for constipation and diarrhea.   Genitourinary:  Negative for frequency and urgency.   Musculoskeletal:  Negative for arthralgias and myalgias.   Integumentary:  Negative for rash and wound.   Neurological:  Negative for weakness and numbness.   Psychiatric/Behavioral:  Negative for sleep disturbance. The patient is not nervous/anxious.           Objective     Physical Exam  Constitutional:       Appearance: Normal appearance. She is obese.   HENT:      Head: Normocephalic and atraumatic.      Nose: Nose normal.      Mouth/Throat:      Mouth: Mucous membranes are moist.      Pharynx: Oropharynx is clear.   Eyes:      Extraocular Movements: Extraocular movements intact.      Conjunctiva/sclera: Conjunctivae normal.      Pupils: Pupils are equal, round, and reactive to light.   Neck:      Vascular: No carotid bruit.   Cardiovascular:      Rate and Rhythm: Normal rate and regular rhythm.      Pulses: Normal pulses.      Heart sounds: Normal heart sounds.   Pulmonary:      Effort: Pulmonary effort is normal.      Breath sounds: Normal breath sounds.   Musculoskeletal:         General: Normal range of motion.      Cervical back: Normal range of motion and neck supple.      Right lower leg: No edema.      Left lower leg: No edema.   Lymphadenopathy:      Cervical: No cervical adenopathy.   Skin:     General:  "Skin is warm and dry.   Neurological:      General: No focal deficit present.      Mental Status: She is alert and oriented to person, place, and time.   Psychiatric:         Mood and Affect: Mood normal.         Behavior: Behavior normal.         Thought Content: Thought content normal.         Judgment: Judgment normal.            /80 (BP Location: Right arm, Patient Position: Sitting, BP Method: Large (Manual))   Pulse 92   Ht 5' 5" (1.651 m)   Wt 82.2 kg (181 lb 1.7 oz)   SpO2 100%   BMI 30.14 kg/m²     Hemoglobin A1C   Date Value Ref Range Status   06/13/2023 11.7 (A) 4.0 - 6.0 % Final   11/02/2022 8.3 (H) <5.7 % of total Hgb Final     Comment:     For someone without known diabetes, a hemoglobin A1c  value of 6.5% or greater indicates that they may have   diabetes and this should be confirmed with a follow-up   test.     For someone with known diabetes, a value <7% indicates   that their diabetes is well controlled and a value   greater than or equal to 7% indicates suboptimal   control. A1c targets should be individualized based on   duration of diabetes, age, comorbid conditions, and   other considerations.     Currently, no consensus exists regarding use of  hemoglobin A1c for diagnosis of diabetes for children.         08/03/2021 9.5 (H) <5.7 % of total Hgb Final     Comment:     For someone without known diabetes, a hemoglobin A1c  value of 6.5% or greater indicates that they may have   diabetes and this should be confirmed with a follow-up   test.     For someone with known diabetes, a value <7% indicates   that their diabetes is well controlled and a value   greater than or equal to 7% indicates suboptimal   control. A1c targets should be individualized based on   duration of diabetes, age, comorbid conditions, and   other considerations.     Currently, no consensus exists regarding use of  hemoglobin A1c for diagnosis of diabetes for children.             Chemistry        Component Value " "Date/Time     12/15/2023 0000     06/06/2015 0725    K 4.4 12/15/2023 0000    K 4.2 06/06/2015 0725     12/15/2023 0000     06/06/2015 0725    CO2 28 12/15/2023 0000    BUN 12 12/15/2023 0000    CREATININE 0.74 12/15/2023 0000    CREATININE 0.70 06/06/2015 0725     (H) 12/15/2023 0000        Component Value Date/Time    CALCIUM 9.1 12/15/2023 0000    ALKPHOS 105 10/10/2018 0743    AST 13 12/15/2023 0000    ALT 22 12/15/2023 0000    BILITOT 1.0 12/15/2023 0000    ESTGFRAFRICA 110 12/18/2020 0739    EGFRNONAA 95 12/18/2020 0739          Lab Results   Component Value Date    CHOL 154 12/15/2023     Lab Results   Component Value Date    HDL 63 12/15/2023     Lab Results   Component Value Date    LDLCALC 71 12/15/2023     Lab Results   Component Value Date    TRIG 117 12/15/2023     Lab Results   Component Value Date    CHOLHDL 2.4 12/15/2023     Lab Results   Component Value Date    MICALBCREAT 6 12/15/2023     Lab Results   Component Value Date    TSH 2.01 12/15/2023     No results found for: "[id number]"    Assessment and Plan     1. Type 2 diabetes mellitus without complication, with long-term current use of insulin  Comprehensive Metabolic Panel    Hemoglobin A1C    Lipid Panel    Microalbumin/Creatinine Ratio, Urine    TSH      2. Type 2 Diabetes Mellitus        3. Congenital hypothyroidism without goiter        4. Vitamin D Deficiency                PLAN   On atorvastatin  Should be on ace.   Cont    --30 Toujeo    Cont mounjaro 5 mg--denies any personal or family hx of thyroid or pancreatic cancer or pancreatitis.    Cont humalog ss.         ORDERS 04/10/2024     6 mo with fasting cmp, lipids, A1c, tsh, urine m/c    "

## 2024-05-09 ENCOUNTER — PATIENT MESSAGE (OUTPATIENT)
Dept: ENDOCRINOLOGY | Facility: CLINIC | Age: 49
End: 2024-05-09
Payer: COMMERCIAL

## 2024-05-14 DIAGNOSIS — E11.9 TYPE 2 DIABETES MELLITUS WITHOUT COMPLICATION, WITH LONG-TERM CURRENT USE OF INSULIN: Primary | ICD-10-CM

## 2024-05-14 DIAGNOSIS — Z79.4 TYPE 2 DIABETES MELLITUS WITHOUT COMPLICATION, WITH LONG-TERM CURRENT USE OF INSULIN: Primary | ICD-10-CM

## 2024-05-14 DIAGNOSIS — E11.65 TYPE 2 DIABETES MELLITUS WITH HYPERGLYCEMIA, WITHOUT LONG-TERM CURRENT USE OF INSULIN: ICD-10-CM

## 2024-05-14 RX ORDER — PEN NEEDLE, DIABETIC 30 GX3/16"
NEEDLE, DISPOSABLE MISCELLANEOUS
Qty: 100 EACH | Status: SHIPPED | OUTPATIENT
Start: 2024-05-14

## 2024-05-14 RX ORDER — TIRZEPATIDE 2.5 MG/.5ML
2.5 INJECTION, SOLUTION SUBCUTANEOUS
Qty: 4 PEN | Refills: 3 | Status: SHIPPED | OUTPATIENT
Start: 2024-05-14

## 2024-10-03 ENCOUNTER — LAB VISIT (OUTPATIENT)
Dept: LAB | Facility: HOSPITAL | Age: 49
End: 2024-10-03
Attending: NURSE PRACTITIONER
Payer: COMMERCIAL

## 2024-10-03 DIAGNOSIS — E11.65 TYPE 2 DIABETES MELLITUS WITH HYPERGLYCEMIA, WITHOUT LONG-TERM CURRENT USE OF INSULIN: ICD-10-CM

## 2024-10-03 DIAGNOSIS — Z79.4 TYPE 2 DIABETES MELLITUS WITHOUT COMPLICATION, WITH LONG-TERM CURRENT USE OF INSULIN: ICD-10-CM

## 2024-10-03 DIAGNOSIS — E11.9 TYPE 2 DIABETES MELLITUS WITHOUT COMPLICATION, WITH LONG-TERM CURRENT USE OF INSULIN: ICD-10-CM

## 2024-10-03 LAB
ALBUMIN SERPL BCP-MCNC: 4.1 G/DL (ref 3.5–5.2)
ALP SERPL-CCNC: 91 U/L (ref 55–135)
ALT SERPL W/O P-5'-P-CCNC: 17 U/L (ref 10–44)
ANION GAP SERPL CALC-SCNC: 11 MMOL/L (ref 8–16)
AST SERPL-CCNC: 15 U/L (ref 10–40)
BILIRUB SERPL-MCNC: 0.5 MG/DL (ref 0.1–1)
BUN SERPL-MCNC: 8 MG/DL (ref 6–20)
CALCIUM SERPL-MCNC: 9.7 MG/DL (ref 8.7–10.5)
CHLORIDE SERPL-SCNC: 103 MMOL/L (ref 95–110)
CHOLEST SERPL-MCNC: 249 MG/DL (ref 120–199)
CHOLEST/HDLC SERPL: 3.3 {RATIO} (ref 2–5)
CO2 SERPL-SCNC: 22 MMOL/L (ref 23–29)
CREAT SERPL-MCNC: 0.8 MG/DL (ref 0.5–1.4)
EST. GFR  (NO RACE VARIABLE): >60 ML/MIN/1.73 M^2
ESTIMATED AVG GLUCOSE: 128 MG/DL (ref 68–131)
GLUCOSE SERPL-MCNC: 107 MG/DL (ref 70–110)
HBA1C MFR BLD: 6.1 % (ref 4–5.6)
HDLC SERPL-MCNC: 75 MG/DL (ref 40–75)
HDLC SERPL: 30.1 % (ref 20–50)
LDLC SERPL CALC-MCNC: 150.8 MG/DL (ref 63–159)
NONHDLC SERPL-MCNC: 174 MG/DL
POTASSIUM SERPL-SCNC: 4.6 MMOL/L (ref 3.5–5.1)
PROT SERPL-MCNC: 7.3 G/DL (ref 6–8.4)
SODIUM SERPL-SCNC: 136 MMOL/L (ref 136–145)
TRIGL SERPL-MCNC: 116 MG/DL (ref 30–150)
TSH SERPL DL<=0.005 MIU/L-ACNC: 3.84 UIU/ML (ref 0.4–4)

## 2024-10-03 PROCEDURE — 80053 COMPREHEN METABOLIC PANEL: CPT | Performed by: NURSE PRACTITIONER

## 2024-10-03 PROCEDURE — 84443 ASSAY THYROID STIM HORMONE: CPT | Performed by: NURSE PRACTITIONER

## 2024-10-03 PROCEDURE — 80061 LIPID PANEL: CPT | Performed by: NURSE PRACTITIONER

## 2024-10-03 PROCEDURE — 83036 HEMOGLOBIN GLYCOSYLATED A1C: CPT | Performed by: NURSE PRACTITIONER

## 2024-11-21 ENCOUNTER — OFFICE VISIT (OUTPATIENT)
Dept: ENDOCRINOLOGY | Facility: CLINIC | Age: 49
End: 2024-11-21
Payer: COMMERCIAL

## 2024-11-21 VITALS
WEIGHT: 174.06 LBS | HEART RATE: 82 BPM | DIASTOLIC BLOOD PRESSURE: 83 MMHG | HEIGHT: 65 IN | BODY MASS INDEX: 29 KG/M2 | SYSTOLIC BLOOD PRESSURE: 125 MMHG

## 2024-11-21 DIAGNOSIS — E11.9 TYPE 2 DIABETES MELLITUS TREATED WITH INSULIN: ICD-10-CM

## 2024-11-21 DIAGNOSIS — E03.1 CONGENITAL HYPOTHYROIDISM WITHOUT GOITER: ICD-10-CM

## 2024-11-21 DIAGNOSIS — Z79.4 TYPE 2 DIABETES MELLITUS TREATED WITH INSULIN: ICD-10-CM

## 2024-11-21 DIAGNOSIS — E11.9 TYPE 2 DIABETES MELLITUS WITHOUT COMPLICATION, WITHOUT LONG-TERM CURRENT USE OF INSULIN: Primary | ICD-10-CM

## 2024-11-21 DIAGNOSIS — E88.810 METABOLIC SYNDROME X: ICD-10-CM

## 2024-11-21 PROCEDURE — 99999 PR PBB SHADOW E&M-EST. PATIENT-LVL III: CPT | Mod: PBBFAC,,, | Performed by: NURSE PRACTITIONER

## 2024-11-21 RX ORDER — METFORMIN HYDROCHLORIDE 750 MG/1
750 TABLET, EXTENDED RELEASE ORAL 2 TIMES DAILY
Qty: 180 TABLET | Refills: 3 | Status: SHIPPED | OUTPATIENT
Start: 2024-11-21

## 2024-11-21 RX ORDER — TIRZEPATIDE 5 MG/.5ML
5 INJECTION, SOLUTION SUBCUTANEOUS
Qty: 4 PEN | Refills: 12 | Status: SHIPPED | OUTPATIENT
Start: 2024-11-21 | End: 2025-02-19

## 2024-11-21 NOTE — PROGRESS NOTES
Subjective     Patient ID: Jessica Pope is a 49 y.o. female.    Chief Complaint: Diabetes     HPI  Pt is a 49 y.o. wf  with a diagnosis of Type 2  diabetes mellitus diagnosed approximately 2007 as gestational, and then converted to Type 2 DM --2013. , as well as chronic conditions pending review including hypothryoidism, hlp. .  Other pertinent medical and social information noted includes, but not limited to: none . Works as . [employer], good historian     Interim Events:Nov 21, 2024:  Off toujeo x 3 months. Doing great. Wt down 7 lbs, but about 25 from 18 mo ago.  Sensor downloaded. Running near euglycemic ATC. No focal complaints.     Current DM meds:   Toujeo 30 units, mounjaro 5 mg. Humalog ss,   metformin 750 xr bid --(cannot tolerate higher dose).        Failed DM meds:  glimeride 4 mg bid (d/c r/t hypoglycemia).        Back up plan for insulin pump hiatus:   Statin: atorvastatin 40mg        Not tolerated statin : na   ACE/ARB:none        Not tolerated ACE/ARB: none   Known Diabetic complications: none     Tech Passord:  Barbie:  [email or web address]  PW:  [email or web address]      CGMS Interpretation:       Sensor/Pump Interpretation or Prominent Theme: No recent hypoglycemia. Glucoses hovering low 100 range with some  expected prandial glucose elevations.       Oct 4, 2023:  Pt initially seen and was  pending EN r/t DUB but reported a1c 11%.  Pt started on Tresiba--which she has titrated to 32 units and novolog ss.  Glucoses improved, but still abovoe goal . No hypoglycemia.   Drug costs are a concern as she has marked deductible.        April 10, 2024:  No acute events or focal complaints.  Had recent labs but no A1C.  Sensor indicates average of 6.1% no c/o hypoglycemia     October 18, 2023:  Pt for 2 week sensor review after adding mounjaro, tresiba --with interim downware adjustment, and d/c of glimiperide in interim r/t c/o nocturnal hypoglycemia.  Pt doing well.  Hypoglycemia eliminated.        Aug  9, 2023:  A1c recently per ob gyn--June 2023--11%. Likley pending EN.  Pt is asymptomatic regarding hyperglycemia. On max glimepiride and tolerated metformin.     Avoiding juices, drinking water    Bkft   usually just toast  or  rare cinnamon roll from [employer]--but this is a rarity   Lunch--fast food to left overs    Supper--Tries to cook at [employer],  likes to smoke meat.        Review of Systems   Constitutional:  Negative for activity change and fatigue.   HENT:  Negative for hearing loss and trouble swallowing.    Eyes:  Negative for photophobia and visual disturbance.        Last Eye Exam:  overdue 2 years.     Respiratory:  Negative for cough and shortness of breath.    Cardiovascular:  Negative for chest pain and palpitations.   Gastrointestinal:  Positive for reflux. Negative for constipation and diarrhea.   Genitourinary:  Negative for frequency and urgency.   Musculoskeletal:  Negative for arthralgias and myalgias.   Integumentary:  Negative for rash and wound.   Neurological:  Negative for weakness and numbness.   Psychiatric/Behavioral:  Negative for sleep disturbance. The patient is not nervous/anxious.           Objective     Physical Exam  Constitutional:       Appearance: Normal appearance. She is obese.   HENT:      Head: Normocephalic and atraumatic.      Nose: Nose normal.      Mouth/Throat:      Mouth: Mucous membranes are moist.      Pharynx: Oropharynx is clear.   Eyes:      Extraocular Movements: Extraocular movements intact.      Conjunctiva/sclera: Conjunctivae normal.      Pupils: Pupils are equal, round, and reactive to light.   Neck:      Vascular: No carotid bruit.   Cardiovascular:      Rate and Rhythm: Normal rate and regular rhythm.      Pulses: Normal pulses.      Heart sounds: Normal heart sounds.   Pulmonary:      Effort: Pulmonary effort is normal.      Breath sounds: Normal breath sounds.   Musculoskeletal:         General: Normal range of motion.      Cervical back: Normal  "range of motion and neck supple.      Right lower leg: No edema.      Left lower leg: No edema.   Lymphadenopathy:      Cervical: No cervical adenopathy.   Skin:     General: Skin is warm and dry.   Neurological:      General: No focal deficit present.      Mental Status: She is alert and oriented to person, place, and time.   Psychiatric:         Mood and Affect: Mood normal.         Behavior: Behavior normal.         Thought Content: Thought content normal.         Judgment: Judgment normal.            /83   Pulse 82   Ht 5' 5" (1.651 m)   Wt 78.9 kg (174 lb 0.9 oz)   BMI 28.96 kg/m²     Hemoglobin A1C   Date Value Ref Range Status   10/03/2024 6.1 (H) 4.0 - 5.6 % Final     Comment:     ADA Screening Guidelines:  5.7-6.4%  Consistent with prediabetes  >or=6.5%  Consistent with diabetes    High levels of fetal hemoglobin interfere with the HbA1C  assay. Heterozygous hemoglobin variants (HbS, HgC, etc)do  not significantly interfere with this assay.   However, presence of multiple variants may affect accuracy.     06/13/2023 11.7 (A) 4.0 - 6.0 % Final   11/02/2022 8.3 (H) <5.7 % of total Hgb Final     Comment:     For someone without known diabetes, a hemoglobin A1c  value of 6.5% or greater indicates that they may have   diabetes and this should be confirmed with a follow-up   test.     For someone with known diabetes, a value <7% indicates   that their diabetes is well controlled and a value   greater than or equal to 7% indicates suboptimal   control. A1c targets should be individualized based on   duration of diabetes, age, comorbid conditions, and   other considerations.     Currently, no consensus exists regarding use of  hemoglobin A1c for diagnosis of diabetes for children.             Chemistry        Component Value Date/Time     10/03/2024 0815     06/06/2015 0725    K 4.6 10/03/2024 0815    K 4.2 06/06/2015 0725     10/03/2024 0815     06/06/2015 0725    CO2 22 (L) " "10/03/2024 0815    BUN 8 10/03/2024 0815    CREATININE 0.8 10/03/2024 0815    CREATININE 0.70 06/06/2015 0725     10/03/2024 0815        Component Value Date/Time    CALCIUM 9.7 10/03/2024 0815    ALKPHOS 91 10/03/2024 0815    AST 15 10/03/2024 0815    ALT 17 10/03/2024 0815    BILITOT 0.5 10/03/2024 0815    ESTGFRAFRICA 110 12/18/2020 0739    EGFRNONAA 95 12/18/2020 0739          Lab Results   Component Value Date    CHOL 249 (H) 10/03/2024     Lab Results   Component Value Date    HDL 75 10/03/2024     Lab Results   Component Value Date    LDLCALC 150.8 10/03/2024     Lab Results   Component Value Date    TRIG 116 10/03/2024     Lab Results   Component Value Date    CHOLHDL 30.1 10/03/2024     Lab Results   Component Value Date    MICALBCREAT 4.8 10/03/2024     Lab Results   Component Value Date    TSH 3.841 10/03/2024     No results found for: "[id number]"    Assessment and Plan     1. Type 2 diabetes mellitus without complication, without long-term current use of insulin        2. Type 2 diabetes mellitus treated with insulin  metFORMIN (GLUCOPHAGE-XR) 750 MG ER 24hr tablet    tirzepatide (MOUNJARO) 5 mg/0.5 mL PnIj      3. Metabolic syndrome X  tirzepatide (MOUNJARO) 5 mg/0.5 mL PnIj      4. Congenital hypothyroidism without goiter              PLAN   On atorvastatin  Should be on ace.   Cont mounjaro 5 mg--denies any personal or family hx of thyroid or pancreatic cancer or pancreatitis.  Cont metformin 750 xr bid--max tolerated.   Cont humalog ss.     ORDERS 11/21/2024     Pt doing great.  F/u primary, welcome to return prn   "